# Patient Record
Sex: FEMALE | Race: WHITE | NOT HISPANIC OR LATINO | Employment: OTHER | URBAN - METROPOLITAN AREA
[De-identification: names, ages, dates, MRNs, and addresses within clinical notes are randomized per-mention and may not be internally consistent; named-entity substitution may affect disease eponyms.]

---

## 2019-08-06 DIAGNOSIS — I10 ESSENTIAL HYPERTENSION: Primary | ICD-10-CM

## 2019-08-26 ENCOUNTER — CLINICAL SUPPORT (OUTPATIENT)
Dept: CARDIOLOGY | Facility: CLINIC | Age: 69
End: 2019-08-26
Payer: COMMERCIAL

## 2019-08-26 ENCOUNTER — OFFICE VISIT (OUTPATIENT)
Dept: CARDIOLOGY | Facility: CLINIC | Age: 69
End: 2019-08-26
Payer: COMMERCIAL

## 2019-08-26 VITALS
BODY MASS INDEX: 30.71 KG/M2 | WEIGHT: 173.31 LBS | DIASTOLIC BLOOD PRESSURE: 80 MMHG | HEIGHT: 63 IN | HEART RATE: 78 BPM | SYSTOLIC BLOOD PRESSURE: 132 MMHG

## 2019-08-26 DIAGNOSIS — E66.9 OBESITY (BMI 30-39.9): ICD-10-CM

## 2019-08-26 DIAGNOSIS — Z82.49 FAMILY HISTORY OF ISCHEMIC HEART DISEASE: ICD-10-CM

## 2019-08-26 DIAGNOSIS — E78.2 MIXED HYPERLIPIDEMIA: Chronic | ICD-10-CM

## 2019-08-26 DIAGNOSIS — I10 ESSENTIAL HYPERTENSION: Chronic | ICD-10-CM

## 2019-08-26 DIAGNOSIS — I77.9 CAROTID ARTERY DISEASE WITHOUT CEREBRAL INFARCTION: Chronic | ICD-10-CM

## 2019-08-26 DIAGNOSIS — R05.9 COUGH: ICD-10-CM

## 2019-08-26 DIAGNOSIS — R94.31 ABNORMAL ECG: Chronic | ICD-10-CM

## 2019-08-26 DIAGNOSIS — R07.89 ATYPICAL CHEST PAIN: Primary | ICD-10-CM

## 2019-08-26 DIAGNOSIS — I10 ESSENTIAL HYPERTENSION: ICD-10-CM

## 2019-08-26 PROCEDURE — 93010 EKG 12-LEAD: ICD-10-PCS | Mod: S$PBB,,, | Performed by: NUCLEAR MEDICINE

## 2019-08-26 PROCEDURE — 93010 ELECTROCARDIOGRAM REPORT: CPT | Mod: S$PBB,,, | Performed by: NUCLEAR MEDICINE

## 2019-08-26 PROCEDURE — 93005 ELECTROCARDIOGRAM TRACING: CPT | Mod: PBBFAC | Performed by: NUCLEAR MEDICINE

## 2019-08-26 PROCEDURE — 99999 PR PBB SHADOW E&M-EST. PATIENT-LVL III: ICD-10-PCS | Mod: PBBFAC,,, | Performed by: INTERNAL MEDICINE

## 2019-08-26 PROCEDURE — 99213 OFFICE O/P EST LOW 20 MIN: CPT | Mod: PBBFAC,25 | Performed by: INTERNAL MEDICINE

## 2019-08-26 PROCEDURE — 99204 OFFICE O/P NEW MOD 45 MIN: CPT | Mod: S$PBB,,, | Performed by: INTERNAL MEDICINE

## 2019-08-26 PROCEDURE — 99999 PR PBB SHADOW E&M-EST. PATIENT-LVL III: CPT | Mod: PBBFAC,,, | Performed by: INTERNAL MEDICINE

## 2019-08-26 PROCEDURE — 99204 PR OFFICE/OUTPT VISIT, NEW, LEVL IV, 45-59 MIN: ICD-10-PCS | Mod: S$PBB,,, | Performed by: INTERNAL MEDICINE

## 2019-08-26 RX ORDER — TOPIRAMATE 100 MG/1
100 TABLET, FILM COATED ORAL 2 TIMES DAILY
COMMUNITY

## 2019-08-26 NOTE — PROGRESS NOTES
Subjective:    Patient ID:  Renea Wang is a 69 y.o. female who presents for evaluation of Hypertension; Hyperlipidemia; Carotid Artery Disease; Chest Pain; and Risk Factor Management For Atherosclerosis      HPI Mrs. Wang presents for f/u.    Her current medical conditions include DM, HTN, dyslipidemia, carotid disease, obesity. Nonsmoker.   Family h/o CV disease.   Negative stress MPI 12/12.    Pt last seen 2015.  She has chronic cough, and saw her other doctors without specific cause and was then told to see Cardiology again.  CT chest showed some aortic and coronary calcification.  ecg today shows NSR, possible LVH, no acute changes.  She has some cp sxs, left sided to back, short lasting, nonexertional, 4 - 5 seconds per pt.  sxs x one month.  Some dyspnea associated with cough.  She does not cough every day; sxs > 1 year.   Lipids controlled on statin, Tricor.   Weight down 25 pounds since 2015.        Current Outpatient Medications:     amlodipine-atorvastatin 2.5-20 mg per tablet, Take 1 tablet by mouth once daily., Disp: , Rfl: 1    atorvastatin (LIPITOR) 40 MG tablet, Take 40 mg by mouth once daily., Disp: , Rfl:     bumetanide (BUMEX) 0.5 MG Tab, Take 0.5 mg by mouth once daily., Disp: , Rfl:     buPROPion (WELLBUTRIN XL) 300 MG 24 hr tablet, , Disp: , Rfl: 3    escitalopram oxalate (LEXAPRO) 20 MG tablet, , Disp: , Rfl: 1    fenofibrate (TRICOR) 145 MG tablet, Take 145 mg by mouth once daily., Disp: , Rfl: 2    latanoprost 0.005 % ophthalmic solution, Place 1 drop into both eyes every evening., Disp: , Rfl:     metformin (GLUCOPHAGE) 500 MG tablet, Take 500 mg by mouth 2 (two) times daily with meals., Disp: , Rfl:     pantoprazole (PROTONIX) 40 MG tablet, Take 40 mg by mouth once daily., Disp: , Rfl: 3    timolol 0.5 % ophthalmic solution, 1 drop every morning., Disp: , Rfl:     topiramate (TOPAMAX) 100 MG tablet, Take 100 mg by mouth 2 (two) times daily., Disp: , Rfl:       Review of  "Systems   Constitution: Positive for weight loss.   HENT: Negative.    Eyes: Negative.    Cardiovascular: Positive for chest pain.   Respiratory: Positive for cough and shortness of breath.    Endocrine: Negative.    Hematologic/Lymphatic: Negative.    Skin: Negative.    Musculoskeletal: Positive for arthritis and back pain.   Gastrointestinal: Negative.    Genitourinary: Negative.    Neurological: Negative.    Psychiatric/Behavioral: Negative.    Allergic/Immunologic: Negative.        /80 (BP Location: Right arm, Patient Position: Sitting, BP Method: Medium (Manual))   Pulse 78   Ht 5' 3" (1.6 m)   Wt 78.6 kg (173 lb 4.5 oz)   BMI 30.70 kg/m²     Wt Readings from Last 3 Encounters:   08/26/19 78.6 kg (173 lb 4.5 oz)   12/21/15 84.8 kg (187 lb)   10/09/15 89.8 kg (198 lb)     Temp Readings from Last 3 Encounters:   No data found for Temp     BP Readings from Last 3 Encounters:   08/26/19 132/80   12/21/15 120/82   10/09/15 106/70     Pulse Readings from Last 3 Encounters:   08/26/19 78   12/21/15 86   10/09/15 80          Objective:    Physical Exam   Constitutional: She is oriented to person, place, and time. Vital signs are normal. She appears well-developed and well-nourished. She is active and cooperative. She does not have a sickly appearance. She does not appear ill. No distress.   HENT:   Head: Normocephalic.   Neck: Neck supple. Normal carotid pulses, no hepatojugular reflux and no JVD present. Carotid bruit is not present. No thyromegaly present.   Cardiovascular: Normal rate, regular rhythm, S1 normal, S2 normal, normal heart sounds and normal pulses. PMI is not displaced. Exam reveals no gallop and no friction rub.   No murmur heard.  Pulses:       Radial pulses are 2+ on the right side, and 2+ on the left side.   Pulmonary/Chest: Effort normal and breath sounds normal. She has no wheezes. She has no rales.   Abdominal: Soft. Normal appearance, normal aorta and bowel sounds are normal. She " exhibits no pulsatile liver, no abdominal bruit, no ascites and no mass. There is no splenomegaly or hepatomegaly. There is no tenderness.   obese   Musculoskeletal: She exhibits no edema.   Lymphadenopathy:     She has no cervical adenopathy.   Neurological: She is alert and oriented to person, place, and time.   Skin: Skin is warm. She is not diaphoretic.   Psychiatric: She has a normal mood and affect. Her behavior is normal.   Nursing note and vitals reviewed.      I have reviewed all pertinent labs and cardiac studies.            Assessment:       1. Atypical chest pain    2. Family history of ischemic heart disease    3. Obesity (BMI 30-39.9)    4. Abnormal ECG    5. Carotid artery disease without cerebral infarction    6. Mixed hyperlipidemia    7. Essential hypertension    8. Cough         Plan:             Atypical cp -- at risk for CAD.  Cough probably noncardiac in origin.  Stress MPI.  Echocardiogram.  Carotid u/s.  Continue current meds.  Risk factor modification.  Weight loss.  Phone review for test results.

## 2019-09-17 ENCOUNTER — CLINICAL SUPPORT (OUTPATIENT)
Dept: CARDIOLOGY | Facility: CLINIC | Age: 69
End: 2019-09-17
Attending: INTERNAL MEDICINE
Payer: MEDICARE

## 2019-09-17 ENCOUNTER — TELEPHONE (OUTPATIENT)
Dept: CARDIOLOGY | Facility: CLINIC | Age: 69
End: 2019-09-17

## 2019-09-17 ENCOUNTER — HOSPITAL ENCOUNTER (OUTPATIENT)
Dept: RADIOLOGY | Facility: HOSPITAL | Age: 69
Discharge: HOME | End: 2019-09-17
Attending: INTERNAL MEDICINE
Payer: MEDICARE

## 2019-09-17 DIAGNOSIS — I10 ESSENTIAL HYPERTENSION: Chronic | ICD-10-CM

## 2019-09-17 DIAGNOSIS — R05.9 COUGH: ICD-10-CM

## 2019-09-17 DIAGNOSIS — Z82.49 FAMILY HISTORY OF ISCHEMIC HEART DISEASE: ICD-10-CM

## 2019-09-17 DIAGNOSIS — I77.9 CAROTID ARTERY DISEASE WITHOUT CEREBRAL INFARCTION: Chronic | ICD-10-CM

## 2019-09-17 DIAGNOSIS — R07.89 ATYPICAL CHEST PAIN: ICD-10-CM

## 2019-09-17 DIAGNOSIS — R94.31 ABNORMAL ECG: Chronic | ICD-10-CM

## 2019-09-17 DIAGNOSIS — R94.31 ABNORMAL ECG: ICD-10-CM

## 2019-09-17 DIAGNOSIS — E66.9 OBESITY (BMI 30-39.9): ICD-10-CM

## 2019-09-17 DIAGNOSIS — I10 ESSENTIAL HYPERTENSION: ICD-10-CM

## 2019-09-17 DIAGNOSIS — I77.9 CAROTID ARTERY DISEASE WITHOUT CEREBRAL INFARCTION: ICD-10-CM

## 2019-09-17 LAB
DIASTOLIC DYSFUNCTION: NO
DIASTOLIC DYSFUNCTION: NO
ESTIMATED PA SYSTOLIC PRESSURE: 22.01
INTERNAL CAROTID STENOSIS: NORMAL
RETIRED EF AND QEF - SEE NOTES: 60 (ref 55–65)

## 2019-09-17 PROCEDURE — 93018 NM MULTI PHARM STRESS CARDIAC COMPONENT: ICD-10-PCS | Mod: S$PBB,,, | Performed by: INTERNAL MEDICINE

## 2019-09-17 PROCEDURE — 93018 CV STRESS TEST I&R ONLY: CPT | Mod: S$PBB,,, | Performed by: INTERNAL MEDICINE

## 2019-09-17 PROCEDURE — 93016 CV STRESS TEST SUPVJ ONLY: CPT | Mod: S$PBB,,, | Performed by: INTERNAL MEDICINE

## 2019-09-17 PROCEDURE — 93016 NM MULTI PHARM STRESS CARDIAC COMPONENT: ICD-10-PCS | Mod: S$PBB,,, | Performed by: INTERNAL MEDICINE

## 2019-09-17 PROCEDURE — 93306 TTE W/DOPPLER COMPLETE: CPT | Mod: PBBFAC | Performed by: INTERNAL MEDICINE

## 2019-09-17 PROCEDURE — 93880 EXTRACRANIAL BILAT STUDY: CPT | Mod: PBBFAC | Performed by: INTERNAL MEDICINE

## 2019-09-17 PROCEDURE — A9502 TC99M TETROFOSMIN: HCPCS

## 2019-09-17 PROCEDURE — 78452 HT MUSCLE IMAGE SPECT MULT: CPT | Mod: 26,,, | Performed by: INTERNAL MEDICINE

## 2019-09-17 PROCEDURE — 93306 2D ECHO WITH COLOR FLOW DOPPLER: ICD-10-PCS | Mod: 26,S$PBB,, | Performed by: INTERNAL MEDICINE

## 2019-09-17 PROCEDURE — 93880 CAR US DOPPLER CAROTID BILATERAL: ICD-10-PCS | Mod: 26,S$PBB,, | Performed by: INTERNAL MEDICINE

## 2019-09-17 PROCEDURE — 78452 NM MULTI PHARM STRESS CARDIAC COMPONENT: ICD-10-PCS | Mod: 26,,, | Performed by: INTERNAL MEDICINE

## 2019-09-17 NOTE — TELEPHONE ENCOUNTER
Please call pt.  She passed her nuclear stress test.  No blockages noted.  Echo shows normal heart strength/function.  Carotid u/s shows no blockages.  No problems noted on tests.    F/u prn.    Dr Roldan

## 2019-09-18 NOTE — TELEPHONE ENCOUNTER
Spoke with patient to advise her that she passed her nuclear stress test.  No blockages noted.  Echo shows normal heart strength/function.  Carotid u/s shows no blockages.  No problems noted on tests.  Follow up as needed.  Denies questions/concerns.

## 2025-07-03 DIAGNOSIS — M43.10 SPONDYLOLISTHESIS, SITE UNSPECIFIED: Primary | ICD-10-CM

## 2025-07-14 ENCOUNTER — CLINICAL SUPPORT (OUTPATIENT)
Dept: REHABILITATION | Facility: HOSPITAL | Age: 75
End: 2025-07-14
Payer: MEDICARE

## 2025-07-14 DIAGNOSIS — G89.29 CHRONIC BILATERAL LOW BACK PAIN WITHOUT SCIATICA: Primary | Chronic | ICD-10-CM

## 2025-07-14 DIAGNOSIS — M54.50 CHRONIC BILATERAL LOW BACK PAIN WITHOUT SCIATICA: Primary | Chronic | ICD-10-CM

## 2025-07-14 PROCEDURE — 97110 THERAPEUTIC EXERCISES: CPT | Mod: PN | Performed by: PHYSICAL THERAPIST

## 2025-07-14 PROCEDURE — 97530 THERAPEUTIC ACTIVITIES: CPT | Mod: PN | Performed by: PHYSICAL THERAPIST

## 2025-07-14 PROCEDURE — 97162 PT EVAL MOD COMPLEX 30 MIN: CPT | Mod: PN | Performed by: PHYSICAL THERAPIST

## 2025-07-14 NOTE — LETTER
July 15, 2025  Baldomero Argueta MD  [id number]  [facility] [address]  [facility] [id number]      To whom it may concern,     The attached plan of care is being sent to you for review and reference.    You may indicate your approval by signing the document electronically, or by faxing/mailing a signed copy of the final page of this document back to the attention of Yunior Sauer PT:         Plan of Care 7/15/25   Effective from: 7/15/2025  Effective to: 9/19/2025    Plan ID: [id number]            Participants as of Finalize on 7/15/2025    Name Type Comments Contact Info    Baldomero Argueta MD Referring Provider  [phone]    Yunior Sauer PT Physical Therapist         Last Plan Note     Author: Yunior Sauer PT Status: Signed Last edited: 7/14/2025  9:30 AM         Outpatient Rehab    Physical Therapy Evaluation    Patient Name: YEN Wang  MRN: [id number]  YOB: 1950  Encounter Date: 7/14/2025    Therapy Diagnosis:   Encounter Diagnosis   Name Primary?    Chronic bilateral low back pain without sciatica Yes     Physician: Baldomero Argueta, *    Physician Orders: Eval and Treat  Medical Diagnosis: Spondylolisthesis, site unspecified  Surgical Diagnosis: Lumbar fusion multi level   Surgical Date: 2/25/2025  Days Since Last Surgery: 140    Visit # / Visits Authorized:  1 / 1  Insurance Authorization Period: 7/3/2025 to 7/3/2026  Date of Evaluation: 7/14/2025  Plan of Care Certification: 7/14/2025 to 9/12/2025     Time In: 0930   Time Out: 1030  Total Time (in minutes): 60   Total Billable Time (in minutes): 60    Intake Outcome Measure for FOTO Survey    Therapist reviewed FOTO scores for YEN Wang on 7/14/2025.   FOTO report - see Media section or FOTO account episode details.     Intake Score: 40%    Precautions:   none    Subjective   History of Present Illness  YEN is a 74 y.o. female who reports to physical therapy with a chief concern of back pain.     The patient  reports a medical diagnosis of M43.10 (ICD-10-CM) - Spondylolisthesis, site unspecified.  Patient reports a surgery of Lumbar fusion multi level. Surgery occurred on 02/25/25. Diagnostic tests related to this condition: X-ray.   X-Ray Details: see chart.    Dominant Hand: Right  History of Present Condition/Illness: Back surgery with multilevel lumbar fusion done 2/25/2025.  She feels much better with less pain but still getting pain in her back and surgeon referred her to PT for lumbar strengthening per her report.      Pain     Patient reports a current pain level of 4/10. Pain at best is reported as 4/10. Pain at worst is reported as 8/10.   Location: Bilateral lumbar area.  Clinical Progression (since onset): Improved  Pain Qualities: Dull, Aching, Tenderness, Tightness  Pain-Relieving Factors: Heat, Medications - over-the-counter, Lying down, Sleeping, Sitting  Pain-Aggravating Factors: Cooking, Lifting, Standing, Other (Comment)  Other Pain-Aggravating Factors: pushing a grocery cart especially heavy one.           Past Medical History/Physical Systems Review:   Renea Wang  has a past medical history of Abnormal ECG, Carotid artery disease without cerebral infarction, Diabetes mellitus, Hyperlipidemia, and Hypertension.    Renea Wang  has no past surgical history on file.    Renea has a current medication list which includes the following prescription(s): amlodipine-atorvastatin, atorvastatin, bumetanide, bupropion, escitalopram oxalate, fenofibrate, latanoprost, metformin, pantoprazole, timolol, and topiramate.    Review of patient's allergies indicates:   Allergen Reactions    Pcn [penicillins]      Develops yeast infections        Objective   Posture    Increased lumbar lordosis is observed.            Abdominal distension noted in standing with poor abdominal tone.    Spinal Muscle Palpation  Right Spinal Muscle Palpation  Abnormal: Lumbar/Sacral  Right Lumbar/Sacral Muscle Palpation  Observations: tightness in lumbar paraspinals       Left Spinal Muscle Palpation  Abnormal: Lumbar/Sacral  Left Lumbar/Sacral Muscle Palpation Observations: tightness in lumbar paraspinals.       Hip Palpation  Right Hip Palpation  Abnormal: Lumbar/Sacral Muscle  Right Lumbar/Sacral Muscle Palpation Observations: tightness in lumbar paraspinals       Left Hip Palpation  Abnormal: Lumbar/Sacral Muscle  Left Lumbar/Sacral Muscle Palpation Observations: tightness in lumbar paraspinals.            Lumbar Range of Motion   Active (deg) Passive (deg) Pain   Flexion  (able to flex finger tip to eva but without reversal of curve due to lumbar fusion.)       Extension  (WFL but with some increased back pain.)       Right Lateral Flexion 10       Right Rotation  (50%)       Left Lateral Flexion 10       Left Rotation  (50%)                     Lumbar Strength    Transverse Abdominus Strength Testing: minimal inward contraction in standing and quadruped.            Knee Strength   Right Strength Right Pain Left Strength Left  Pain   Flexion (S2)           Prone Flexion           Extension (L3) 4   4                   Balance Test     Single Leg Stand - Right Foot: 5 sec  Single Leg Stand - Left Foot: 5 sec            Treatment:     Treatments performed today are in BOLD:    CPT Intervention  7/14/2025   TE Supine Double KTC Legs over ball 5 min  Supine LTR legs over ball 5 min  Supine pelvic tilt legs over ball with diaphragmatic breathing 5 min.  Supine single KTC and LTR for HEP demo  Bent knee fall outs B 10 reps with core engagement.   TE Time 15 min.   TA Nu Step (S=)  10 min  Leg press assess weight B 3 x 10 reps.  Education on condition, sleeping, sitting posture, HEP.   TA Time 15 min   NMR Quadruped Cat and Camel 3 x 10 reps  Quadruped abdominals 3 x 10  Prayer stretch 3 x 30 sec   NMR Time 15 min   MT     MT Time     PLAN Cont with POC.          Assessment & Plan   Assessment  YEN presents with a condition of  Moderate complexity.   Presentation of Symptoms: Changing  Will Comorbidities Impact Care: Yes  See chart.    Functional Limitations: Activity tolerance, Ambulating on uneven surfaces, Carrying objects, Completing self-care activities, Decreased ambulation distance/endurance, Disrupted sleep pattern, Functional mobility, Maintaining balance, Pain with ADLs/IADLs, Painful locomotion/ambulation, Performing household chores, Range of motion, Sitting tolerance, Squatting, Standing tolerance, Transfers  Impairments: Abnormal muscle firing, Abnormal muscle tone, Abnormal or restricted range of motion, Activity intolerance, Impaired balance, Impaired physical strength, Lack of appropriate home exercise program, Pain with functional activity, Weight-bearing intolerance    Patient Goal for Therapy (PT): Patient would like to be able to stand for cooking and cleaning without increased back pain.  Prognosis: Good    Plan  From a physical therapy perspective, the patient would benefit from: Skilled Rehab Services    Planned therapy interventions include: Therapeutic exercise, Therapeutic activities, Neuromuscular re-education, and Manual therapy.    Planned modalities to include: Cryotherapy (cold pack), Electrical stimulation - attended, Electrical stimulation - passive/unattended, and Thermotherapy (hot pack).        Visit Frequency: 2 times Per Week for 8 Weeks.       This plan was discussed with Patient.   Discussion participants: Agreed Upon Plan of Care             The patient's spiritual, cultural, and educational needs were considered, and the patient is agreeable to the plan of care and goals.     Education  Education was done with Patient. The patient's learning style includes Demonstration, Listening, and Pictures/video. The patient Demonstrates understanding and Verbalizes understanding.                 Goals:   Active       Functional outcome       Patient will show a significant change in FOTO patient-reported  outcome tool to 53% to demonstrate subjective improvement       Start:  07/15/25    Expected End:  09/12/25            Patient stated goal: Patient would like to be able to stand for cooking and cleaning without increased back pain.        Start:  07/15/25    Expected End:  09/12/25            Patient will demonstrate independence in home program for support of progression       Start:  07/15/25    Expected End:  09/12/25               Pain       Patient will report pain of 4/10 demonstrating a reduction of overall pain       Start:  07/15/25    Expected End:  09/12/25            Patient will report a 2 point reduction in pain while performing ADL's.       Start:  07/15/25    Expected End:  09/12/25               Range of Motion       Patient will achieve bilateral spinal side bending ROM 15 degrees       Start:  07/15/25    Expected End:  09/12/25            Patient will achieve bilateral spinal rotation ROM 75%.       Start:  07/15/25    Expected End:  09/12/25               Strength       Patient will demonstrate 90 sec abdominal strength on 1/2 plank.       Start:  07/15/25    Expected End:  09/12/25               Strength       Patient will achieve bilateral knee extension strength of 4+/5       Start:  07/15/25    Expected End:  09/12/25                Yunior Sauer PT           Current Participants as of 7/15/2025    Name Type Comments Contact Info    Baldomero Argueta MD Referring Provider  [phone]    Signature pending    Yunior Sauer PT Physical Therapist                  Sincerely,      Yunior Sauer PT  [facility]                                                            Dear Yunior Sauer PT,    RE: Ms. Renea Wang, MRN: [id number]    I certify that I have reviewed the attached plan of care and agree to the details within.        ___________________________  ___________________________  Provider Printed Name   Provider Signed Name      ___________________________  Date and Time

## 2025-07-15 NOTE — PROGRESS NOTES
Outpatient Rehab    Physical Therapy Evaluation    Patient Name: YEN Wang  MRN: [id number]  YOB: 1950  Encounter Date: 7/14/2025    Therapy Diagnosis:   Encounter Diagnosis   Name Primary?    Chronic bilateral low back pain without sciatica Yes     Physician: Baldomero Argueta, *    Physician Orders: Eval and Treat  Medical Diagnosis: Spondylolisthesis, site unspecified  Surgical Diagnosis: Lumbar fusion multi level   Surgical Date: 2/25/2025  Days Since Last Surgery: 140    Visit # / Visits Authorized:  1 / 1  Insurance Authorization Period: 7/3/2025 to 7/3/2026  Date of Evaluation: 7/14/2025  Plan of Care Certification: 7/14/2025 to 9/12/2025     Time In: 0930   Time Out: 1030  Total Time (in minutes): 60   Total Billable Time (in minutes): 60    Intake Outcome Measure for FOTO Survey    Therapist reviewed FOTO scores for YEN Wang on 7/14/2025.   FOTO report - see Media section or FOTO account episode details.     Intake Score: 40%    Precautions:   none    Subjective   History of Present Illness  YEN is a 74 y.o. female who reports to physical therapy with a chief concern of back pain.     The patient reports a medical diagnosis of M43.10 (ICD-10-CM) - Spondylolisthesis, site unspecified.  Patient reports a surgery of Lumbar fusion multi level. Surgery occurred on 02/25/25. Diagnostic tests related to this condition: X-ray.   X-Ray Details: see chart.    Dominant Hand: Right  History of Present Condition/Illness: Back surgery with multilevel lumbar fusion done 2/25/2025.  She feels much better with less pain but still getting pain in her back and surgeon referred her to PT for lumbar strengthening per her report.      Pain     Patient reports a current pain level of 4/10. Pain at best is reported as 4/10. Pain at worst is reported as 8/10.   Location: Bilateral lumbar area.  Clinical Progression (since onset): Improved  Pain Qualities: Dull, Aching, Tenderness,  Tightness  Pain-Relieving Factors: Heat, Medications - over-the-counter, Lying down, Sleeping, Sitting  Pain-Aggravating Factors: Cooking, Lifting, Standing, Other (Comment)  Other Pain-Aggravating Factors: pushing a grocery cart especially heavy one.           Past Medical History/Physical Systems Review:   Renea Wang  has a past medical history of Abnormal ECG, Carotid artery disease without cerebral infarction, Diabetes mellitus, Hyperlipidemia, and Hypertension.    Renea Wang  has no past surgical history on file.    Renea has a current medication list which includes the following prescription(s): amlodipine-atorvastatin, atorvastatin, bumetanide, bupropion, escitalopram oxalate, fenofibrate, latanoprost, metformin, pantoprazole, timolol, and topiramate.    Review of patient's allergies indicates:   Allergen Reactions    Pcn [penicillins]      Develops yeast infections        Objective   Posture    Increased lumbar lordosis is observed.            Abdominal distension noted in standing with poor abdominal tone.    Spinal Muscle Palpation  Right Spinal Muscle Palpation  Abnormal: Lumbar/Sacral  Right Lumbar/Sacral Muscle Palpation Observations: tightness in lumbar paraspinals       Left Spinal Muscle Palpation  Abnormal: Lumbar/Sacral  Left Lumbar/Sacral Muscle Palpation Observations: tightness in lumbar paraspinals.       Hip Palpation  Right Hip Palpation  Abnormal: Lumbar/Sacral Muscle  Right Lumbar/Sacral Muscle Palpation Observations: tightness in lumbar paraspinals       Left Hip Palpation  Abnormal: Lumbar/Sacral Muscle  Left Lumbar/Sacral Muscle Palpation Observations: tightness in lumbar paraspinals.            Lumbar Range of Motion   Active (deg) Passive (deg) Pain   Flexion  (able to flex finger tip to eva but without reversal of curve due to lumbar fusion.)       Extension  (WFL but with some increased back pain.)       Right Lateral Flexion 10       Right Rotation  (50%)       Left  Lateral Flexion 10       Left Rotation  (50%)                     Lumbar Strength    Transverse Abdominus Strength Testing: minimal inward contraction in standing and quadruped.            Knee Strength   Right Strength Right Pain Left Strength Left  Pain   Flexion (S2)           Prone Flexion           Extension (L3) 4   4                   Balance Test     Single Leg Stand - Right Foot: 5 sec  Single Leg Stand - Left Foot: 5 sec            Treatment:     Treatments performed today are in BOLD:    CPT Intervention  7/14/2025   TE Supine Double KTC Legs over ball 5 min  Supine LTR legs over ball 5 min  Supine pelvic tilt legs over ball with diaphragmatic breathing 5 min.  Supine single KTC and LTR for HEP demo  Bent knee fall outs B 10 reps with core engagement.   TE Time 15 min.   TA Nu Step (S=)  10 min  Leg press assess weight B 3 x 10 reps.  Education on condition, sleeping, sitting posture, HEP.   TA Time 15 min   NMR Quadruped Cat and Camel 3 x 10 reps  Quadruped abdominals 3 x 10  Prayer stretch 3 x 30 sec   NMR Time 15 min   MT     MT Time     PLAN Cont with POC.          Assessment & Plan   Assessment  YEN presents with a condition of Moderate complexity.   Presentation of Symptoms: Changing  Will Comorbidities Impact Care: Yes  See chart.    Functional Limitations: Activity tolerance, Ambulating on uneven surfaces, Carrying objects, Completing self-care activities, Decreased ambulation distance/endurance, Disrupted sleep pattern, Functional mobility, Maintaining balance, Pain with ADLs/IADLs, Painful locomotion/ambulation, Performing household chores, Range of motion, Sitting tolerance, Squatting, Standing tolerance, Transfers  Impairments: Abnormal muscle firing, Abnormal muscle tone, Abnormal or restricted range of motion, Activity intolerance, Impaired balance, Impaired physical strength, Lack of appropriate home exercise program, Pain with functional activity, Weight-bearing intolerance    Patient  Goal for Therapy (PT): Patient would like to be able to stand for cooking and cleaning without increased back pain.  Prognosis: Good    Plan  From a physical therapy perspective, the patient would benefit from: Skilled Rehab Services    Planned therapy interventions include: Therapeutic exercise, Therapeutic activities, Neuromuscular re-education, and Manual therapy.    Planned modalities to include: Cryotherapy (cold pack), Electrical stimulation - attended, Electrical stimulation - passive/unattended, and Thermotherapy (hot pack).        Visit Frequency: 2 times Per Week for 8 Weeks.       This plan was discussed with Patient.   Discussion participants: Agreed Upon Plan of Care             The patient's spiritual, cultural, and educational needs were considered, and the patient is agreeable to the plan of care and goals.     Education  Education was done with Patient. The patient's learning style includes Demonstration, Listening, and Pictures/video. The patient Demonstrates understanding and Verbalizes understanding.                 Goals:   Active       Functional outcome       Patient will show a significant change in FOTO patient-reported outcome tool to 53% to demonstrate subjective improvement       Start:  07/15/25    Expected End:  09/12/25            Patient stated goal: Patient would like to be able to stand for cooking and cleaning without increased back pain.        Start:  07/15/25    Expected End:  09/12/25            Patient will demonstrate independence in home program for support of progression       Start:  07/15/25    Expected End:  09/12/25               Pain       Patient will report pain of 4/10 demonstrating a reduction of overall pain       Start:  07/15/25    Expected End:  09/12/25            Patient will report a 2 point reduction in pain while performing ADL's.       Start:  07/15/25    Expected End:  09/12/25               Range of Motion       Patient will achieve bilateral spinal  side bending ROM 15 degrees       Start:  07/15/25    Expected End:  09/12/25            Patient will achieve bilateral spinal rotation ROM 75%.       Start:  07/15/25    Expected End:  09/12/25               Strength       Patient will demonstrate 90 sec abdominal strength on 1/2 plank.       Start:  07/15/25    Expected End:  09/12/25               Strength       Patient will achieve bilateral knee extension strength of 4+/5       Start:  07/15/25    Expected End:  09/12/25                Yunior Sauer, PT

## 2025-07-16 ENCOUNTER — CLINICAL SUPPORT (OUTPATIENT)
Dept: REHABILITATION | Facility: HOSPITAL | Age: 75
End: 2025-07-16
Payer: MEDICARE

## 2025-07-16 DIAGNOSIS — M54.50 CHRONIC BILATERAL LOW BACK PAIN WITHOUT SCIATICA: Primary | Chronic | ICD-10-CM

## 2025-07-16 DIAGNOSIS — G89.29 CHRONIC BILATERAL LOW BACK PAIN WITHOUT SCIATICA: Primary | Chronic | ICD-10-CM

## 2025-07-16 PROCEDURE — 97112 NEUROMUSCULAR REEDUCATION: CPT | Mod: PN | Performed by: PHYSICAL THERAPIST

## 2025-07-16 PROCEDURE — 97110 THERAPEUTIC EXERCISES: CPT | Mod: PN | Performed by: PHYSICAL THERAPIST

## 2025-07-16 PROCEDURE — 97530 THERAPEUTIC ACTIVITIES: CPT | Mod: PN | Performed by: PHYSICAL THERAPIST

## 2025-07-16 NOTE — PROGRESS NOTES
Outpatient Rehab    Physical Therapy Visit    Patient Name: YEN Wang  MRN: [id number]  YOB: 1950  Encounter Date: 7/16/2025    Therapy Diagnosis:   Encounter Diagnosis   Name Primary?    Chronic bilateral low back pain without sciatica Yes     Physician: Baldomero Argueta, *    Physician Orders: Eval and Treat  Medical Diagnosis: Spondylolisthesis, site unspecified  Surgical Diagnosis: Lumbar fusion multi level   Surgical Date: 2/25/2025  Days Since Last Surgery: 141    Visit # / Visits Authorized:  1 / 15  Insurance Authorization Period: 7/11/2025 to 12/31/2025  Date of Evaluation: 7/14/2025  Plan of Care Certification: 7/15/2025 to 9/19/2025      Time In: 0900   Time Out: 1000  Total Time (in minutes): 60   Total Billable Time (in minutes): 60    FOTO:  Intake Score: 40%  Survey Score 2: Not applicable for this Episode%  Survey Score 3: Not applicable for this Episode%    Precautions:   none      Subjective   Patient reports tightness in left back and leg noted with some stretches but doing ok with HEP issued at [facility].  She has difficulty with exhale breath and inward abdominal engagement but did better with practice in session today..  Pain reported as 3/10. back and left leg.    Objective        Objective measure last performed 7/14/2025 at [facility] session    Treatment:     Treatments performed today are in BOLD:    CPT Intervention  7/16/2025   TE Supine legs over ball diaphragmatic breathing 5 min.  Supine pelvic tilt legs over ball with diaphragmatic breathing 5 min.  Supine Double KTC Legs over ball 5 min  Supine LTR legs over ball 5 min  Supine single KTC and LTR for HEP demo  Bent knee fall outs R and L  10 reps with core engagement.   TE Time 25 min.   TA Nu Step (S=1, L=2)  10 min  Leg press 33 lbs B 3 x 10 reps.  Education on condition, sleeping, sitting posture, HEP.   TA Time 15 min   NMR Quadruped Cat and Camel 3 x 10 reps  Quadruped abdominals 3 x 10  Prayer stretch 3 x 30 sec    NMR Time 15 min   MT     MT Time     PLAN Cont with POC.          Assessment & Plan   Assessment: Patient with limited lumbar flexion mobility noted with cat and camel exercises and difficulty with inward core contractions with exhale breath during exercises in session today with some verbal and tactile cues needed.  Evaluation/Treatment Tolerance: Patient tolerated treatment well    The patient will continue to benefit from skilled outpatient physical therapy in order to address the deficits listed in the problem list on the initial evaluation, provide patient and family education, and maximize the patients level of independence in the home and community environments.     The patient's spiritual, cultural, and educational needs were considered, and the patient is agreeable to the plan of care and goals.           Plan: Cont with current POC.    Goals:   Active       Functional outcome       Patient will show a significant change in FOTO patient-reported outcome tool to 53% to demonstrate subjective improvement       Start:  07/15/25    Expected End:  09/19/25            Patient stated goal: Patient would like to be able to stand for cooking and cleaning without increased back pain.        Start:  07/15/25    Expected End:  09/19/25            Patient will demonstrate independence in home program for support of progression       Start:  07/15/25    Expected End:  09/19/25               Pain       Patient will report pain of 4/10 demonstrating a reduction of overall pain       Start:  07/15/25    Expected End:  09/19/25            Patient will report a 2 point reduction in pain while performing ADL's.       Start:  07/15/25    Expected End:  09/19/25               Range of Motion       Patient will achieve bilateral spinal side bending ROM 15 degrees       Start:  07/15/25    Expected End:  09/19/25            Patient will achieve bilateral spinal rotation ROM 75%.       Start:  07/15/25    Expected End:  09/19/25                Strength       Patient will demonstrate 90 sec abdominal strength on 1/2 plank.       Start:  07/15/25    Expected End:  09/19/25               Strength       Patient will achieve bilateral knee extension strength of 4+/5       Start:  07/15/25    Expected End:  09/19/25                Yunior Sauer, PT

## 2025-07-21 ENCOUNTER — CLINICAL SUPPORT (OUTPATIENT)
Dept: REHABILITATION | Facility: HOSPITAL | Age: 75
End: 2025-07-21
Payer: MEDICARE

## 2025-07-21 DIAGNOSIS — G89.29 CHRONIC BILATERAL LOW BACK PAIN WITHOUT SCIATICA: Primary | Chronic | ICD-10-CM

## 2025-07-21 DIAGNOSIS — M54.50 CHRONIC BILATERAL LOW BACK PAIN WITHOUT SCIATICA: Primary | Chronic | ICD-10-CM

## 2025-07-21 PROCEDURE — 97112 NEUROMUSCULAR REEDUCATION: CPT | Mod: PN | Performed by: PHYSICAL THERAPIST

## 2025-07-21 PROCEDURE — 97530 THERAPEUTIC ACTIVITIES: CPT | Mod: PN | Performed by: PHYSICAL THERAPIST

## 2025-07-21 PROCEDURE — 97110 THERAPEUTIC EXERCISES: CPT | Mod: PN | Performed by: PHYSICAL THERAPIST

## 2025-07-21 NOTE — PROGRESS NOTES
Outpatient Rehab    Physical Therapy Visit    Patient Name: YEN Wang  MRN: [id number]  YOB: 1950  Encounter Date: 7/21/2025    Therapy Diagnosis:   Encounter Diagnosis   Name Primary?    Chronic bilateral low back pain without sciatica Yes     Physician: Baldomero Argueta, *    Physician Orders: Eval and Treat  Medical Diagnosis: Spondylolisthesis, site unspecified  Surgical Diagnosis: Lumbar fusion multi level   Surgical Date: 2/25/2025  Days Since Last Surgery: 146    Visit # / Visits Authorized:  2 / 15  Insurance Authorization Period: 7/11/2025 to 12/31/2025  Date of Evaluation: 7/14/2025  Plan of Care Certification: 7/15/2025 to 9/19/2025      Time In: 0920   Time Out: 1020  Total Time (in minutes): 60   Total Billable Time (in minutes): 45    FOTO:  Intake Score: 40%  Survey Score 2: Not applicable for this Episode%  Survey Score 3: Not applicable for this Episode%    Precautions:   none      Subjective   Patient reports she felt better after last session but awoke on her stomache this morning and had increased back pain..  Pain reported as 4/10. back and left leg.    Objective        Objective measure last performed 7/14/2025 at eval session    Treatment:     Treatments performed today are in BOLD:    CPT Intervention  7/21/2025   TE Supine legs over ball diaphragmatic breathing 5 min.  Supine pelvic tilt legs over ball with diaphragmatic breathing 5 min.  Supine Double KTC Legs over ball 5 min  Supine LTR legs over ball 5 min  Supine single KTC and LTR for HEP demo  Bent knee fall outs R and L  10 reps with core engagement.   TE Time 25 min.   TA Nu Step (S=1, L=2)  10 min  Leg press 33 lbs B 3 x 10 reps.  Education on condition, sleeping, sitting posture, HEP.   TA Time 15 min   NMR Quadruped Cat and Camel 3 x 10 reps  Quadruped abdominals 3 x 10  Prayer stretch 3 x 30 sec   NMR Time 15 min   MT     MT Time     PLAN Cont with POC.          Assessment & Plan   Assessment: Patient with  some increased tightness noted in back and legs today but did better with exercises without increased pain during session.  Evaluation/Treatment Tolerance: Patient tolerated treatment well    The patient will continue to benefit from skilled outpatient physical therapy in order to address the deficits listed in the problem list on the initial evaluation, provide patient and family education, and maximize the patients level of independence in the home and community environments.     The patient's spiritual, cultural, and educational needs were considered, and the patient is agreeable to the plan of care and goals.           Plan: Cont with current POC.    Goals:   Active       Functional outcome       Patient will show a significant change in FOTO patient-reported outcome tool to 53% to demonstrate subjective improvement       Start:  07/15/25    Expected End:  09/19/25            Patient stated goal: Patient would like to be able to stand for cooking and cleaning without increased back pain.        Start:  07/15/25    Expected End:  09/19/25            Patient will demonstrate independence in home program for support of progression       Start:  07/15/25    Expected End:  09/19/25               Pain       Patient will report pain of 4/10 demonstrating a reduction of overall pain       Start:  07/15/25    Expected End:  09/19/25            Patient will report a 2 point reduction in pain while performing ADL's.       Start:  07/15/25    Expected End:  09/19/25               Range of Motion       Patient will achieve bilateral spinal side bending ROM 15 degrees       Start:  07/15/25    Expected End:  09/19/25            Patient will achieve bilateral spinal rotation ROM 75%.       Start:  07/15/25    Expected End:  09/19/25               Strength       Patient will demonstrate 90 sec abdominal strength on 1/2 plank.       Start:  07/15/25    Expected End:  09/19/25               Strength       Patient will achieve  bilateral knee extension strength of 4+/5       Start:  07/15/25    Expected End:  09/19/25                Yunior Sauer, PT

## 2025-07-23 ENCOUNTER — CLINICAL SUPPORT (OUTPATIENT)
Dept: REHABILITATION | Facility: HOSPITAL | Age: 75
End: 2025-07-23
Payer: MEDICARE

## 2025-07-23 DIAGNOSIS — G89.29 CHRONIC BILATERAL LOW BACK PAIN WITHOUT SCIATICA: Primary | Chronic | ICD-10-CM

## 2025-07-23 DIAGNOSIS — M54.50 CHRONIC BILATERAL LOW BACK PAIN WITHOUT SCIATICA: Primary | Chronic | ICD-10-CM

## 2025-07-23 PROCEDURE — 97110 THERAPEUTIC EXERCISES: CPT | Mod: PN | Performed by: PHYSICAL THERAPIST

## 2025-07-23 PROCEDURE — 97112 NEUROMUSCULAR REEDUCATION: CPT | Mod: PN | Performed by: PHYSICAL THERAPIST

## 2025-07-23 PROCEDURE — 97530 THERAPEUTIC ACTIVITIES: CPT | Mod: PN | Performed by: PHYSICAL THERAPIST

## 2025-07-23 NOTE — PROGRESS NOTES
Outpatient Rehab    Physical Therapy Visit    Patient Name: YEN Wang  MRN: [id number]  YOB: 1950  Encounter Date: 7/23/2025    Therapy Diagnosis:   Encounter Diagnosis   Name Primary?    Chronic bilateral low back pain without sciatica Yes     Physician: Baldomero Argueta, *    Physician Orders: Eval and Treat  Medical Diagnosis: Spondylolisthesis, site unspecified  Surgical Diagnosis: Lumbar fusion multi level   Surgical Date: 2/25/2025  Days Since Last Surgery: 148    Visit # / Visits Authorized:  3 / 15  Insurance Authorization Period: 7/11/2025 to 12/31/2025  Date of Evaluation: 7/14/2025  Plan of Care Certification: 7/15/2025 to 9/19/2025      Time In: 1300   Time Out: 1400  Total Time (in minutes): 60   Total Billable Time (in minutes): 60    FOTO:  Intake Score: 40%  Survey Score 2: Not applicable for this Episode%  Survey Score 3: Not applicable for this Episode%    Precautions:   none      Subjective   Patient reports she was sore in stomache after her last session and overall feels better with less back pain..  Pain reported as 3/10. back and left leg.    Objective        Objective measure last performed 7/14/2025 at eval session    Treatment:     Treatments performed today are in BOLD:    CPT Intervention  7/23/2025   TE Supine legs over ball diaphragmatic breathing 5 min.  Supine pelvic tilt legs over ball with diaphragmatic breathing 5 min.  Supine Double KTC Legs over ball 5 min  Supine LTR legs over ball 5 min  Supine single KTC and LTR for HEP demo  Bent knee fall outs R and L  3 x 10 reps with core engagement.   TE Time 25 min.   TA Nu Step (S=1, L=3)  10 min  Leg press 33 lbs B 3 x 10 reps.  Education on condition, sleeping, sitting posture, HEP.   TA Time 15 min   NMR Quadruped Cat and Camel 3 x 10 reps  Quadruped abdominals 3 x 10  Prayer stretch 3 x 30 sec   NMR Time 15 min   MT     MT Time     PLAN Cont with POC.          Assessment & Plan   Assessment: Patient with core  weakness and difficulty with engagement of core muscles with diaphragmatic breathing static and even more difficulty during dynamic activities leading to increased lumbar muscle strain.    Evaluation/Treatment Tolerance: Patient tolerated treatment well    The patient will continue to benefit from skilled outpatient physical therapy in order to address the deficits listed in the problem list on the initial evaluation, provide patient and family education, and maximize the patients level of independence in the home and community environments.     The patient's spiritual, cultural, and educational needs were considered, and the patient is agreeable to the plan of care and goals.           Plan: Cont with current POC.    Goals:   Active       Functional outcome       Patient will show a significant change in FOTO patient-reported outcome tool to 53% to demonstrate subjective improvement       Start:  07/15/25    Expected End:  09/19/25            Patient stated goal: Patient would like to be able to stand for cooking and cleaning without increased back pain.        Start:  07/15/25    Expected End:  09/19/25            Patient will demonstrate independence in home program for support of progression       Start:  07/15/25    Expected End:  09/19/25               Pain       Patient will report pain of 4/10 demonstrating a reduction of overall pain       Start:  07/15/25    Expected End:  09/19/25            Patient will report a 2 point reduction in pain while performing ADL's.       Start:  07/15/25    Expected End:  09/19/25               Range of Motion       Patient will achieve bilateral spinal side bending ROM 15 degrees       Start:  07/15/25    Expected End:  09/19/25            Patient will achieve bilateral spinal rotation ROM 75%.       Start:  07/15/25    Expected End:  09/19/25               Strength       Patient will demonstrate 90 sec abdominal strength on 1/2 plank.       Start:  07/15/25    Expected  End:  09/19/25               Strength       Patient will achieve bilateral knee extension strength of 4+/5       Start:  07/15/25    Expected End:  09/19/25                Yunior Sauer, PT

## 2025-07-28 ENCOUNTER — CLINICAL SUPPORT (OUTPATIENT)
Dept: REHABILITATION | Facility: HOSPITAL | Age: 75
End: 2025-07-28
Payer: MEDICARE

## 2025-07-28 DIAGNOSIS — M54.50 CHRONIC BILATERAL LOW BACK PAIN WITHOUT SCIATICA: Primary | Chronic | ICD-10-CM

## 2025-07-28 DIAGNOSIS — G89.29 CHRONIC BILATERAL LOW BACK PAIN WITHOUT SCIATICA: Primary | Chronic | ICD-10-CM

## 2025-07-28 PROCEDURE — 97530 THERAPEUTIC ACTIVITIES: CPT | Mod: PN | Performed by: PHYSICAL THERAPIST

## 2025-07-28 PROCEDURE — 97110 THERAPEUTIC EXERCISES: CPT | Mod: PN | Performed by: PHYSICAL THERAPIST

## 2025-07-28 PROCEDURE — 97112 NEUROMUSCULAR REEDUCATION: CPT | Mod: PN | Performed by: PHYSICAL THERAPIST

## 2025-07-28 NOTE — PROGRESS NOTES
Outpatient Rehab    Physical Therapy Visit    Patient Name: YEN Wang  MRN: [id number]  YOB: 1950  Encounter Date: 7/28/2025    Therapy Diagnosis:   Encounter Diagnosis   Name Primary?    Chronic bilateral low back pain without sciatica Yes     Physician: Baldomero Argueta, *    Physician Orders: Eval and Treat  Medical Diagnosis: Spondylolisthesis, site unspecified  Surgical Diagnosis: Lumbar fusion multi level   Surgical Date: 2/25/2025  Days Since Last Surgery: 153    Visit # / Visits Authorized:  4 / 15  Insurance Authorization Period: 7/11/2025 to 12/31/2025  Date of Evaluation: 7/14/2025  Plan of Care Certification: 7/15/2025 to 9/19/2025      Time In: 0916   Time Out: 1016  Total Time (in minutes): 60   Total Billable Time (in minutes): 45    FOTO:  Intake Score: 40%  Survey Score 2: Not applicable for this Episode%  Survey Score 3: Not applicable for this Episode%    Precautions:   none      Subjective   Patient reports she feels good today and was even able to viv a dog she was dog sitting for her family and did not have increased pain despite the long walk through the grass and mud to catch the dog..  Pain reported as 0/10. back and left leg.    Objective        Objective measure last performed 7/14/2025 at eval session    Treatment:     Treatments performed today are in BOLD:    CPT Intervention  7/28/2025   TE Supine legs over ball diaphragmatic breathing 5 min.  Supine pelvic tilt legs over ball with diaphragmatic breathing 5 min.  Supine Double KTC Legs over ball 5 min  Supine LTR legs over ball 5 min  Supine single KTC and LTR for HEP demo  Bent knee fall outs R and L  3 x 10 reps with core engagement.   TE Time 25 min.   TA Nu Step (S=1, L=3)  10 min (increase level NV)  Leg press 33 lbs B 3 x 10 reps.  Education on condition, sleeping, sitting posture, HEP.   TA Time 15 min   NMR Quadruped Cat and Camel 3 x 10 reps  Quadruped abdominals 3 x 10  Prayer stretch 3 x 30 sec    NMR Time 15 min   MT     MT Time     PLAN Cont with POC.          Assessment & Plan   Assessment: Patient doing better and showing better tolerance at home with her activity levels with less back pain.  She still has distended abdominals and difficulty with core engagement during exercises along with decreased depth of her breath during exercises timed with exhale breath for correct core muscle activation and engagement showing limited ability to maintain core stability for prolonged periods of time.    Evaluation/Treatment Tolerance: Patient tolerated treatment well    The patient will continue to benefit from skilled outpatient physical therapy in order to address the deficits listed in the problem list on the initial evaluation, provide patient and family education, and maximize the patients level of independence in the home and community environments.     The patient's spiritual, cultural, and educational needs were considered, and the patient is agreeable to the plan of care and goals.           Plan: Cont with current POC.    Goals:   Active       Functional outcome       Patient will show a significant change in FOTO patient-reported outcome tool to 53% to demonstrate subjective improvement       Start:  07/15/25    Expected End:  09/19/25            Patient stated goal: Patient would like to be able to stand for cooking and cleaning without increased back pain.        Start:  07/15/25    Expected End:  09/19/25            Patient will demonstrate independence in home program for support of progression       Start:  07/15/25    Expected End:  09/19/25               Pain       Patient will report pain of 4/10 demonstrating a reduction of overall pain       Start:  07/15/25    Expected End:  09/19/25            Patient will report a 2 point reduction in pain while performing ADL's.       Start:  07/15/25    Expected End:  09/19/25               Range of Motion       Patient will achieve bilateral spinal side  bending ROM 15 degrees       Start:  07/15/25    Expected End:  09/19/25            Patient will achieve bilateral spinal rotation ROM 75%.       Start:  07/15/25    Expected End:  09/19/25               Strength       Patient will demonstrate 90 sec abdominal strength on 1/2 plank.       Start:  07/15/25    Expected End:  09/19/25               Strength       Patient will achieve bilateral knee extension strength of 4+/5       Start:  07/15/25    Expected End:  09/19/25                Yunior Sauer, PT

## 2025-07-30 ENCOUNTER — CLINICAL SUPPORT (OUTPATIENT)
Dept: REHABILITATION | Facility: HOSPITAL | Age: 75
End: 2025-07-30
Payer: MEDICARE

## 2025-07-30 DIAGNOSIS — G89.29 CHRONIC BILATERAL LOW BACK PAIN WITHOUT SCIATICA: Primary | Chronic | ICD-10-CM

## 2025-07-30 DIAGNOSIS — M54.50 CHRONIC BILATERAL LOW BACK PAIN WITHOUT SCIATICA: Primary | Chronic | ICD-10-CM

## 2025-07-30 PROCEDURE — 97112 NEUROMUSCULAR REEDUCATION: CPT | Mod: PN | Performed by: PHYSICAL THERAPIST

## 2025-07-30 PROCEDURE — 97110 THERAPEUTIC EXERCISES: CPT | Mod: PN | Performed by: PHYSICAL THERAPIST

## 2025-07-30 PROCEDURE — 97530 THERAPEUTIC ACTIVITIES: CPT | Mod: PN | Performed by: PHYSICAL THERAPIST

## 2025-07-31 NOTE — PROGRESS NOTES
Outpatient Rehab    Physical Therapy Visit    Patient Name: YEN Wang  MRN: [id number]  YOB: 1950  Encounter Date: 7/30/2025    Therapy Diagnosis:   Encounter Diagnosis   Name Primary?    Chronic bilateral low back pain without sciatica Yes     Physician: Baldomero Argueta, *    Physician Orders: Eval and Treat  Medical Diagnosis: Spondylolisthesis, site unspecified  Surgical Diagnosis: Lumbar fusion multi level   Surgical Date: 2/25/2025  Days Since Last Surgery: 155    Visit # / Visits Authorized:  5 / 15  Insurance Authorization Period: 7/11/2025 to 12/31/2025  Date of Evaluation: 7/14/2025  Plan of Care Certification: 7/15/2025 to 9/19/2025      Time In: 0856   Time Out: 0956  Total Time (in minutes): 60   Total Billable Time (in minutes): 45    FOTO:  Intake Score: 40%  Survey Score 2: Not applicable for this Episode%  Survey Score 3: Not applicable for this Episode%    Precautions:   none      Subjective   Patient reports she feels good still but gets some ankle stiffness on left with prolonged walking..  Pain reported as 0/10. back and left leg.    Objective        Objective measure last performed 7/14/2025 at eval session    Treatment:     Treatments performed today are in BOLD:    CPT Intervention  7/30/2025   TE Supine legs over ball diaphragmatic breathing 5 min.  Supine pelvic tilt legs over ball with diaphragmatic breathing 5 min.  Supine Double KTC Legs over ball 5 min  Supine LTR legs over ball 5 min  Supine single KTC and LTR for HEP demo  Gastroc slantboard 30 sec holds 3 min.  Bent knee fall outs R and L  3 x 10 reps with core engagement.   TE Time 25 min.   TA Nu Step (S=1, L=4)  10 min  Leg press 33 lbs B 3 x 10 reps.  Education on condition, sleeping, sitting posture, HEP.   TA Time 15 min   NMR Quadruped Cat and Camel 3 x 10 reps  Quadruped abdominals 3 x 10  Prayer stretch 3 x 30 sec   NMR Time 15 min   MT     MT Time     PLAN Cont with POC.          Assessment & Plan    Assessment: Patient with tightness noted in Bilateral calf muscles and stretches does to assist with tightness and to improve gait function for better back symptom relief.  Evaluation/Treatment Tolerance: Patient tolerated treatment well    The patient will continue to benefit from skilled outpatient physical therapy in order to address the deficits listed in the problem list on the initial evaluation, provide patient and family education, and maximize the patients level of independence in the home and community environments.     The patient's spiritual, cultural, and educational needs were considered, and the patient is agreeable to the plan of care and goals.           Plan: Cont with current POC.    Goals:   Active       Functional outcome       Patient will show a significant change in FOTO patient-reported outcome tool to 53% to demonstrate subjective improvement       Start:  07/15/25    Expected End:  09/19/25            Patient stated goal: Patient would like to be able to stand for cooking and cleaning without increased back pain.        Start:  07/15/25    Expected End:  09/19/25            Patient will demonstrate independence in home program for support of progression       Start:  07/15/25    Expected End:  09/19/25               Pain       Patient will report pain of 4/10 demonstrating a reduction of overall pain       Start:  07/15/25    Expected End:  09/19/25            Patient will report a 2 point reduction in pain while performing ADL's.       Start:  07/15/25    Expected End:  09/19/25               Range of Motion       Patient will achieve bilateral spinal side bending ROM 15 degrees       Start:  07/15/25    Expected End:  09/19/25            Patient will achieve bilateral spinal rotation ROM 75%.       Start:  07/15/25    Expected End:  09/19/25               Strength       Patient will demonstrate 90 sec abdominal strength on 1/2 plank.       Start:  07/15/25    Expected End:  09/19/25                Strength       Patient will achieve bilateral knee extension strength of 4+/5       Start:  07/15/25    Expected End:  09/19/25                Yunior Sauer PT

## 2025-08-04 ENCOUNTER — CLINICAL SUPPORT (OUTPATIENT)
Dept: REHABILITATION | Facility: HOSPITAL | Age: 75
End: 2025-08-04
Payer: MEDICARE

## 2025-08-04 DIAGNOSIS — M54.50 CHRONIC BILATERAL LOW BACK PAIN WITHOUT SCIATICA: Primary | Chronic | ICD-10-CM

## 2025-08-04 DIAGNOSIS — G89.29 CHRONIC BILATERAL LOW BACK PAIN WITHOUT SCIATICA: Primary | Chronic | ICD-10-CM

## 2025-08-04 PROCEDURE — 97110 THERAPEUTIC EXERCISES: CPT | Mod: PN | Performed by: PHYSICAL THERAPIST

## 2025-08-04 PROCEDURE — 97112 NEUROMUSCULAR REEDUCATION: CPT | Mod: PN | Performed by: PHYSICAL THERAPIST

## 2025-08-04 PROCEDURE — 97530 THERAPEUTIC ACTIVITIES: CPT | Mod: PN | Performed by: PHYSICAL THERAPIST

## 2025-08-05 NOTE — PROGRESS NOTES
Outpatient Rehab    Physical Therapy Visit    Patient Name: YEN Wang  MRN: [id number]  YOB: 1950  Encounter Date: 8/4/2025    Therapy Diagnosis:   Encounter Diagnosis   Name Primary?    Chronic bilateral low back pain without sciatica Yes     Physician: Baldomero Argueta, *    Physician Orders: Eval and Treat  Medical Diagnosis: Spondylolisthesis, site unspecified  Surgical Diagnosis: Lumbar fusion multi level   Surgical Date: 2/25/2025  Days Since Last Surgery: 160    Visit # / Visits Authorized:  6 / 15  Insurance Authorization Period: 7/11/2025 to 12/31/2025  Date of Evaluation: 7/14/2025  Plan of Care Certification: 7/15/2025 to 9/19/2025      Time In: 0848   Time Out: 0949  Total Time (in minutes): 61   Total Billable Time (in minutes): 45    FOTO:  Intake Score: 40%  Survey Score 2: Not applicable for this Episode%  Survey Score 3: Not applicable for this Episode%    Precautions:   none      Subjective   Patient reports she is feeling better and had a better weekend with less back pain but still very stiff..  Pain reported as 0/10. back and left leg.    Objective        Objective measure last performed 7/14/2025 at eval session    Treatment:     Treatments performed today are in BOLD:    CPT Intervention  8/4/2025   TE Supine legs over ball diaphragmatic breathing 5 min.  Supine pelvic tilt legs over ball with diaphragmatic breathing 5 min.  Supine Double KTC Legs over ball 5 min  Supine LTR legs over ball 5 min  Supine single KTC and LTR for HEP demo  Gastroc slantboard 30 sec holds 3 min.  Bent knee fall outs R and L  3 x 10 reps with core engagement.   TE Time 25 min.   TA Nu Step (S=1, L=4)  10 min  Leg press 33 lbs B 3 x 10 reps.  Education on condition, sleeping, sitting posture, HEP.   TA Time 15 min   NMR Quadruped Cat and Camel 3 x 10 reps  Quadruped abdominals 3 x 10  Prayer stretch 3 x 30 sec   NMR Time 15 min   PLAN Cont with POC.          Assessment & Plan   Assessment:  Patient did well with better core muscle engagement noted during exercises today with less verbal cues needed.  Evaluation/Treatment Tolerance: Patient tolerated treatment well    The patient will continue to benefit from skilled outpatient physical therapy in order to address the deficits listed in the problem list on the initial evaluation, provide patient and family education, and maximize the patients level of independence in the home and community environments.     The patient's spiritual, cultural, and educational needs were considered, and the patient is agreeable to the plan of care and goals.           Plan: Cont with current POC.    Goals:   Active       Functional outcome       Patient will show a significant change in FOTO patient-reported outcome tool to 53% to demonstrate subjective improvement       Start:  07/15/25    Expected End:  09/19/25            Patient stated goal: Patient would like to be able to stand for cooking and cleaning without increased back pain.        Start:  07/15/25    Expected End:  09/19/25            Patient will demonstrate independence in home program for support of progression       Start:  07/15/25    Expected End:  09/19/25               Pain       Patient will report pain of 4/10 demonstrating a reduction of overall pain       Start:  07/15/25    Expected End:  09/19/25            Patient will report a 2 point reduction in pain while performing ADL's.       Start:  07/15/25    Expected End:  09/19/25               Range of Motion       Patient will achieve bilateral spinal side bending ROM 15 degrees       Start:  07/15/25    Expected End:  09/19/25            Patient will achieve bilateral spinal rotation ROM 75%.       Start:  07/15/25    Expected End:  09/19/25               Strength       Patient will demonstrate 90 sec abdominal strength on 1/2 plank.       Start:  07/15/25    Expected End:  09/19/25               Strength       Patient will achieve bilateral knee  extension strength of 4+/5       Start:  07/15/25    Expected End:  09/19/25                Yunior Sauer, PT

## 2025-08-06 ENCOUNTER — CLINICAL SUPPORT (OUTPATIENT)
Dept: REHABILITATION | Facility: HOSPITAL | Age: 75
End: 2025-08-06
Payer: MEDICARE

## 2025-08-06 DIAGNOSIS — M54.50 CHRONIC BILATERAL LOW BACK PAIN WITHOUT SCIATICA: Primary | Chronic | ICD-10-CM

## 2025-08-06 DIAGNOSIS — G89.29 CHRONIC BILATERAL LOW BACK PAIN WITHOUT SCIATICA: Primary | Chronic | ICD-10-CM

## 2025-08-06 PROCEDURE — 97112 NEUROMUSCULAR REEDUCATION: CPT | Mod: PN

## 2025-08-06 PROCEDURE — 97110 THERAPEUTIC EXERCISES: CPT | Mod: PN

## 2025-08-06 PROCEDURE — 97530 THERAPEUTIC ACTIVITIES: CPT | Mod: PN

## 2025-08-06 NOTE — PROGRESS NOTES
Outpatient Rehab    Physical Therapy Visit    Patient Name: YEN Wang  MRN: [id number]  YOB: 1950  Encounter Date: 8/6/2025    Therapy Diagnosis:   Encounter Diagnosis   Name Primary?    Chronic bilateral low back pain without sciatica Yes     Physician: Baldomero Argueta, *    Physician Orders: Eval and Treat  Medical Diagnosis: Spondylolisthesis, site unspecified  Surgical Diagnosis: Lumbar fusion multi level   Surgical Date: 2/25/2025  Days Since Last Surgery: 162    Visit # / Visits Authorized:  7 / 15  Insurance Authorization Period: 7/11/2025 to 12/31/2025  Date of Evaluation: 7/14/2025  Plan of Care Certification: 7/15/2025 to 9/19/2025      Time In: 0900   Time Out: 1000  Total Time (in minutes): 60   Total Billable Time (in minutes):      FOTO:  Intake Score: 40%  Survey Score 2: Not applicable for this Episode%  Survey Score 3: Not applicable for this Episode%    Precautions:   none    Subjective   She reports feeling good today..         Objective        Objective measure last performed 7/14/2025 at eval session    Treatment:     Treatments performed today are in BOLD:    CPT Intervention  8/6/2025   TE Supine legs over ball diaphragmatic breathing 5 min.  Supine pelvic tilt legs over ball with diaphragmatic breathing 5 min.  Supine Double KTC Legs over ball 5 min  Supine LTR legs over ball 5 min  Supine single KTC and LTR for HEP demo  Gastroc slantboard 30 sec holds 3 min.  Bent knee fall outs R and L  3 x 10 reps with core engagement.   TE Time 25 min.   TA Nu Step (S=1, L=4)  10 min  Leg press 33 lbs B 3 x 10 reps.  Education on condition, sleeping, sitting posture, HEP.   TA Time 15 min   NMR Quadruped Cat and Camel 3 x 10 reps  Quadruped abdominals 3 x 10  Prayer stretch 3 x 30 sec   NMR Time 15 min   PLAN Cont with POC.          Assessment & Plan   Assessment: Patient tolerated therapy session well today. Cueing on cat/cow for decreased push-up motion to focus on more core  activation. Decreased low back soreness after session, but bilateral knee soreness reported.        The patient will continue to benefit from skilled outpatient physical therapy in order to address the deficits listed in the problem list on the initial evaluation, provide patient and family education, and maximize the patients level of independence in the home and community environments.     The patient's spiritual, cultural, and educational needs were considered, and the patient is agreeable to the plan of care and goals.           Plan: Cont with current POC.    Goals:   Active       Functional outcome       Patient will show a significant change in FOTO patient-reported outcome tool to 53% to demonstrate subjective improvement       Start:  07/15/25    Expected End:  09/19/25            Patient stated goal: Patient would like to be able to stand for cooking and cleaning without increased back pain.        Start:  07/15/25    Expected End:  09/19/25            Patient will demonstrate independence in home program for support of progression       Start:  07/15/25    Expected End:  09/19/25               Pain       Patient will report pain of 4/10 demonstrating a reduction of overall pain       Start:  07/15/25    Expected End:  09/19/25            Patient will report a 2 point reduction in pain while performing ADL's.       Start:  07/15/25    Expected End:  09/19/25               Range of Motion       Patient will achieve bilateral spinal side bending ROM 15 degrees       Start:  07/15/25    Expected End:  09/19/25            Patient will achieve bilateral spinal rotation ROM 75%.       Start:  07/15/25    Expected End:  09/19/25               Strength       Patient will demonstrate 90 sec abdominal strength on 1/2 plank.       Start:  07/15/25    Expected End:  09/19/25               Strength       Patient will achieve bilateral knee extension strength of 4+/5       Start:  07/15/25    Expected End:  09/19/25                 Fely Mccoy, PT

## 2025-08-11 ENCOUNTER — CLINICAL SUPPORT (OUTPATIENT)
Dept: REHABILITATION | Facility: HOSPITAL | Age: 75
End: 2025-08-11
Payer: MEDICARE

## 2025-08-11 DIAGNOSIS — M54.50 CHRONIC BILATERAL LOW BACK PAIN WITHOUT SCIATICA: Primary | Chronic | ICD-10-CM

## 2025-08-11 DIAGNOSIS — G89.29 CHRONIC BILATERAL LOW BACK PAIN WITHOUT SCIATICA: Primary | Chronic | ICD-10-CM

## 2025-08-11 PROCEDURE — 97110 THERAPEUTIC EXERCISES: CPT | Mod: PN

## 2025-08-11 PROCEDURE — 97140 MANUAL THERAPY 1/> REGIONS: CPT | Mod: PN

## 2025-08-11 PROCEDURE — 97530 THERAPEUTIC ACTIVITIES: CPT | Mod: PN

## 2025-08-13 ENCOUNTER — CLINICAL SUPPORT (OUTPATIENT)
Dept: REHABILITATION | Facility: HOSPITAL | Age: 75
End: 2025-08-13
Payer: MEDICARE

## 2025-08-13 DIAGNOSIS — G89.29 CHRONIC BILATERAL LOW BACK PAIN WITHOUT SCIATICA: Primary | Chronic | ICD-10-CM

## 2025-08-13 DIAGNOSIS — M54.50 CHRONIC BILATERAL LOW BACK PAIN WITHOUT SCIATICA: Primary | Chronic | ICD-10-CM

## 2025-08-13 PROCEDURE — 97112 NEUROMUSCULAR REEDUCATION: CPT | Mod: PN | Performed by: PHYSICAL THERAPIST

## 2025-08-13 PROCEDURE — 97530 THERAPEUTIC ACTIVITIES: CPT | Mod: PN | Performed by: PHYSICAL THERAPIST

## 2025-08-13 PROCEDURE — 97110 THERAPEUTIC EXERCISES: CPT | Mod: PN | Performed by: PHYSICAL THERAPIST

## 2025-08-13 PROCEDURE — 97140 MANUAL THERAPY 1/> REGIONS: CPT | Mod: PN | Performed by: PHYSICAL THERAPIST

## 2025-08-18 ENCOUNTER — CLINICAL SUPPORT (OUTPATIENT)
Dept: REHABILITATION | Facility: HOSPITAL | Age: 75
End: 2025-08-18
Payer: MEDICARE

## 2025-08-18 DIAGNOSIS — G89.29 CHRONIC BILATERAL LOW BACK PAIN WITHOUT SCIATICA: Primary | Chronic | ICD-10-CM

## 2025-08-18 DIAGNOSIS — M54.50 CHRONIC BILATERAL LOW BACK PAIN WITHOUT SCIATICA: Primary | Chronic | ICD-10-CM

## 2025-08-18 PROCEDURE — 97140 MANUAL THERAPY 1/> REGIONS: CPT | Mod: PN | Performed by: PHYSICAL THERAPIST

## 2025-08-18 PROCEDURE — 97112 NEUROMUSCULAR REEDUCATION: CPT | Mod: PN | Performed by: PHYSICAL THERAPIST

## 2025-08-18 PROCEDURE — 97530 THERAPEUTIC ACTIVITIES: CPT | Mod: PN | Performed by: PHYSICAL THERAPIST

## 2025-08-18 PROCEDURE — 97110 THERAPEUTIC EXERCISES: CPT | Mod: PN | Performed by: PHYSICAL THERAPIST

## 2025-08-20 ENCOUNTER — CLINICAL SUPPORT (OUTPATIENT)
Dept: REHABILITATION | Facility: HOSPITAL | Age: 75
End: 2025-08-20
Payer: MEDICARE

## 2025-08-20 DIAGNOSIS — M54.50 CHRONIC BILATERAL LOW BACK PAIN WITHOUT SCIATICA: Primary | Chronic | ICD-10-CM

## 2025-08-20 DIAGNOSIS — G89.29 CHRONIC BILATERAL LOW BACK PAIN WITHOUT SCIATICA: Primary | Chronic | ICD-10-CM

## 2025-08-20 PROCEDURE — 97530 THERAPEUTIC ACTIVITIES: CPT | Mod: PN | Performed by: PHYSICAL THERAPIST

## 2025-08-20 PROCEDURE — 97140 MANUAL THERAPY 1/> REGIONS: CPT | Mod: PN | Performed by: PHYSICAL THERAPIST

## 2025-08-20 PROCEDURE — 97112 NEUROMUSCULAR REEDUCATION: CPT | Mod: PN | Performed by: PHYSICAL THERAPIST

## 2025-08-20 PROCEDURE — 97110 THERAPEUTIC EXERCISES: CPT | Mod: PN | Performed by: PHYSICAL THERAPIST

## 2025-08-25 ENCOUNTER — CLINICAL SUPPORT (OUTPATIENT)
Dept: REHABILITATION | Facility: HOSPITAL | Age: 75
End: 2025-08-25
Payer: MEDICARE

## 2025-08-25 DIAGNOSIS — G89.29 CHRONIC BILATERAL LOW BACK PAIN WITHOUT SCIATICA: Primary | Chronic | ICD-10-CM

## 2025-08-25 DIAGNOSIS — M54.50 CHRONIC BILATERAL LOW BACK PAIN WITHOUT SCIATICA: Primary | Chronic | ICD-10-CM

## 2025-08-25 PROCEDURE — 97530 THERAPEUTIC ACTIVITIES: CPT | Mod: PN | Performed by: PHYSICAL THERAPIST

## 2025-08-25 PROCEDURE — 97140 MANUAL THERAPY 1/> REGIONS: CPT | Mod: PN | Performed by: PHYSICAL THERAPIST

## 2025-08-25 PROCEDURE — 97112 NEUROMUSCULAR REEDUCATION: CPT | Mod: PN | Performed by: PHYSICAL THERAPIST

## 2025-09-02 ENCOUNTER — CLINICAL SUPPORT (OUTPATIENT)
Dept: REHABILITATION | Facility: HOSPITAL | Age: 75
End: 2025-09-02
Payer: MEDICARE

## 2025-09-02 DIAGNOSIS — G89.29 CHRONIC BILATERAL LOW BACK PAIN WITHOUT SCIATICA: Primary | Chronic | ICD-10-CM

## 2025-09-02 DIAGNOSIS — M54.50 CHRONIC BILATERAL LOW BACK PAIN WITHOUT SCIATICA: Primary | Chronic | ICD-10-CM

## 2025-09-02 PROCEDURE — 97140 MANUAL THERAPY 1/> REGIONS: CPT | Mod: PN | Performed by: PHYSICAL THERAPIST

## 2025-09-02 PROCEDURE — 97530 THERAPEUTIC ACTIVITIES: CPT | Mod: PN | Performed by: PHYSICAL THERAPIST

## 2025-09-02 PROCEDURE — 97112 NEUROMUSCULAR REEDUCATION: CPT | Mod: PN | Performed by: PHYSICAL THERAPIST

## 2025-09-03 ENCOUNTER — CLINICAL SUPPORT (OUTPATIENT)
Dept: REHABILITATION | Facility: HOSPITAL | Age: 75
End: 2025-09-03
Payer: MEDICARE

## 2025-09-03 DIAGNOSIS — G89.29 CHRONIC BILATERAL LOW BACK PAIN WITHOUT SCIATICA: Primary | Chronic | ICD-10-CM

## 2025-09-03 DIAGNOSIS — M54.50 CHRONIC BILATERAL LOW BACK PAIN WITHOUT SCIATICA: Primary | Chronic | ICD-10-CM

## 2025-09-03 PROCEDURE — 97140 MANUAL THERAPY 1/> REGIONS: CPT | Mod: PN | Performed by: PHYSICAL THERAPIST

## 2025-09-03 PROCEDURE — 97110 THERAPEUTIC EXERCISES: CPT | Mod: PN | Performed by: PHYSICAL THERAPIST

## 2025-09-03 PROCEDURE — 97530 THERAPEUTIC ACTIVITIES: CPT | Mod: PN | Performed by: PHYSICAL THERAPIST
